# Patient Record
Sex: FEMALE | Race: WHITE | Employment: UNEMPLOYED | URBAN - METROPOLITAN AREA
[De-identification: names, ages, dates, MRNs, and addresses within clinical notes are randomized per-mention and may not be internally consistent; named-entity substitution may affect disease eponyms.]

---
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
## 2021-10-27 NOTE — LETTER
[address] [place] [id number]  Phone: [phone]  Fax: [phone], APRN - CNP        November 2, 2018     Patient: Gavin Adrian   YOB: 2000   Date of Visit: 11/2/2018       To Whom it May Concern:    Gavin Adrian was seen in my clinic on 11/2/2018. She may return to school on 11/2/18. If you have any questions or concerns, please don't hesitate to call.     Sincerely,         LACI Gomez - CNP no